# Patient Record
Sex: FEMALE | Employment: UNEMPLOYED | URBAN - METROPOLITAN AREA
[De-identification: names, ages, dates, MRNs, and addresses within clinical notes are randomized per-mention and may not be internally consistent; named-entity substitution may affect disease eponyms.]

---
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
## 2024-03-14 ENCOUNTER — HOSPITAL ENCOUNTER (OUTPATIENT)
Facility: CLINIC | Age: 19
Setting detail: OBSERVATION
Discharge: PSYCHIATRIC OR SUBSTANCE USE FACILITY | End: 2024-03-15
Attending: EMERGENCY MEDICINE | Admitting: EMERGENCY MEDICINE
Payer: COMMERCIAL

## 2024-03-14 DIAGNOSIS — F39 UNSPECIFIED MOOD (AFFECTIVE) DISORDER (H): ICD-10-CM

## 2024-03-14 DIAGNOSIS — F33.1 MODERATE RECURRENT MAJOR DEPRESSION (H): Primary | ICD-10-CM

## 2024-03-14 DIAGNOSIS — F44.9 DISSOCIATION: ICD-10-CM

## 2024-03-14 DIAGNOSIS — R45.851 SUICIDAL IDEATION: ICD-10-CM

## 2024-03-14 PROBLEM — F41.1 GENERALIZED ANXIETY DISORDER: Status: ACTIVE | Noted: 2024-03-14

## 2024-03-14 PROCEDURE — G0378 HOSPITAL OBSERVATION PER HR: HCPCS

## 2024-03-14 PROCEDURE — 99285 EMERGENCY DEPT VISIT HI MDM: CPT

## 2024-03-14 PROCEDURE — 99222 1ST HOSP IP/OBS MODERATE 55: CPT | Performed by: NURSE PRACTITIONER

## 2024-03-14 PROCEDURE — 250N000013 HC RX MED GY IP 250 OP 250 PS 637: Performed by: NURSE PRACTITIONER

## 2024-03-14 RX ORDER — BETAMETHASONE DIPROPIONATE 0.5 MG/G
CREAM TOPICAL 2 TIMES DAILY
Status: DISCONTINUED | OUTPATIENT
Start: 2024-03-14 | End: 2024-03-14

## 2024-03-14 RX ORDER — PRAZOSIN HYDROCHLORIDE 1 MG/1
3 CAPSULE ORAL AT BEDTIME
COMMUNITY
Start: 2024-03-07 | End: 2024-03-15

## 2024-03-14 RX ORDER — VITAMIN B COMPLEX
25 TABLET ORAL DAILY
Status: DISCONTINUED | OUTPATIENT
Start: 2024-03-15 | End: 2024-03-15 | Stop reason: HOSPADM

## 2024-03-14 RX ORDER — PANTOPRAZOLE SODIUM 40 MG/1
40 TABLET, DELAYED RELEASE ORAL DAILY
COMMUNITY
Start: 2024-02-06

## 2024-03-14 RX ORDER — ARIPIPRAZOLE 5 MG/1
5 TABLET ORAL AT BEDTIME
Status: DISCONTINUED | OUTPATIENT
Start: 2024-03-15 | End: 2024-03-15 | Stop reason: HOSPADM

## 2024-03-14 RX ORDER — ONDANSETRON 4 MG/1
4 TABLET, ORALLY DISINTEGRATING ORAL EVERY 6 HOURS PRN
Status: DISCONTINUED | OUTPATIENT
Start: 2024-03-14 | End: 2024-03-15 | Stop reason: HOSPADM

## 2024-03-14 RX ORDER — OXCARBAZEPINE 300 MG/1
300 TABLET, FILM COATED ORAL 2 TIMES DAILY
COMMUNITY

## 2024-03-14 RX ORDER — OXCARBAZEPINE 300 MG/1
300 TABLET, FILM COATED ORAL 2 TIMES DAILY
Status: DISCONTINUED | OUTPATIENT
Start: 2024-03-14 | End: 2024-03-15 | Stop reason: HOSPADM

## 2024-03-14 RX ORDER — LUMATEPERONE 42 MG/1
42 CAPSULE ORAL AT BEDTIME
COMMUNITY
Start: 2024-02-09 | End: 2024-03-15

## 2024-03-14 RX ORDER — NORGESTIMATE AND ETHINYL ESTRADIOL 0.25-0.035
1 KIT ORAL DAILY
COMMUNITY
Start: 2024-02-07

## 2024-03-14 RX ORDER — HYDROXYZINE HYDROCHLORIDE 25 MG/1
25 TABLET, FILM COATED ORAL DAILY PRN
COMMUNITY

## 2024-03-14 RX ORDER — VENLAFAXINE HYDROCHLORIDE 75 MG/1
75 CAPSULE, EXTENDED RELEASE ORAL DAILY
Status: DISCONTINUED | OUTPATIENT
Start: 2024-03-15 | End: 2024-03-15 | Stop reason: HOSPADM

## 2024-03-14 RX ORDER — ARIPIPRAZOLE 5 MG/1
2.5 TABLET ORAL AT BEDTIME
Status: DISCONTINUED | OUTPATIENT
Start: 2024-03-14 | End: 2024-03-14

## 2024-03-14 RX ORDER — HYDROXYZINE HYDROCHLORIDE 25 MG/1
25 TABLET, FILM COATED ORAL 2 TIMES DAILY
COMMUNITY
End: 2024-03-14

## 2024-03-14 RX ORDER — PSYLLIUM HUSK 0.4 G
25 CAPSULE ORAL DAILY
COMMUNITY
Start: 2024-01-22

## 2024-03-14 RX ORDER — CETIRIZINE HYDROCHLORIDE 10 MG/1
10 TABLET ORAL DAILY
COMMUNITY
Start: 2024-01-22

## 2024-03-14 RX ORDER — DOCUSATE SODIUM 100 MG/1
100 CAPSULE, LIQUID FILLED ORAL 2 TIMES DAILY
COMMUNITY

## 2024-03-14 RX ORDER — ACETAMINOPHEN 325 MG/1
650 TABLET ORAL EVERY 4 HOURS PRN
Status: DISCONTINUED | OUTPATIENT
Start: 2024-03-14 | End: 2024-03-15 | Stop reason: HOSPADM

## 2024-03-14 RX ORDER — ARIPIPRAZOLE 5 MG/1
5 TABLET ORAL AT BEDTIME
Status: DISCONTINUED | OUTPATIENT
Start: 2024-03-14 | End: 2024-03-14

## 2024-03-14 RX ORDER — TRAZODONE HYDROCHLORIDE 50 MG/1
50 TABLET, FILM COATED ORAL
Status: DISCONTINUED | OUTPATIENT
Start: 2024-03-14 | End: 2024-03-15 | Stop reason: HOSPADM

## 2024-03-14 RX ORDER — LANOLIN ALCOHOL/MO/W.PET/CERES
3 CREAM (GRAM) TOPICAL
Status: DISCONTINUED | OUTPATIENT
Start: 2024-03-14 | End: 2024-03-15 | Stop reason: HOSPADM

## 2024-03-14 RX ORDER — CETIRIZINE HYDROCHLORIDE 10 MG/1
10 TABLET ORAL DAILY
Status: DISCONTINUED | OUTPATIENT
Start: 2024-03-15 | End: 2024-03-15 | Stop reason: HOSPADM

## 2024-03-14 RX ORDER — NORGESTIMATE AND ETHINYL ESTRADIOL 0.25-0.035
1 KIT ORAL DAILY
Status: DISCONTINUED | OUTPATIENT
Start: 2024-03-15 | End: 2024-03-14

## 2024-03-14 RX ORDER — QUETIAPINE FUMARATE 25 MG/1
25-50 TABLET, FILM COATED ORAL 3 TIMES DAILY PRN
Status: DISCONTINUED | OUTPATIENT
Start: 2024-03-14 | End: 2024-03-15 | Stop reason: HOSPADM

## 2024-03-14 RX ORDER — BETAMETHASONE DIPROPIONATE 0.5 MG/G
OINTMENT, AUGMENTED TOPICAL 2 TIMES DAILY
COMMUNITY
Start: 2023-12-21

## 2024-03-14 RX ORDER — HYDROXYZINE HYDROCHLORIDE 25 MG/1
25 TABLET, FILM COATED ORAL DAILY PRN
Status: DISCONTINUED | OUTPATIENT
Start: 2024-03-14 | End: 2024-03-15 | Stop reason: HOSPADM

## 2024-03-14 RX ORDER — PANTOPRAZOLE SODIUM 40 MG/1
40 TABLET, DELAYED RELEASE ORAL DAILY
Status: DISCONTINUED | OUTPATIENT
Start: 2024-03-15 | End: 2024-03-15 | Stop reason: HOSPADM

## 2024-03-14 RX ORDER — IBUPROFEN 600 MG/1
600 TABLET, FILM COATED ORAL EVERY 6 HOURS PRN
Status: DISCONTINUED | OUTPATIENT
Start: 2024-03-14 | End: 2024-03-15 | Stop reason: HOSPADM

## 2024-03-14 RX ORDER — DOCUSATE SODIUM 100 MG/1
100 CAPSULE, LIQUID FILLED ORAL 2 TIMES DAILY
Status: DISCONTINUED | OUTPATIENT
Start: 2024-03-14 | End: 2024-03-15 | Stop reason: HOSPADM

## 2024-03-14 RX ORDER — CLONAZEPAM 0.5 MG/1
0.5 TABLET ORAL 2 TIMES DAILY PRN
Status: DISCONTINUED | OUTPATIENT
Start: 2024-03-14 | End: 2024-03-15 | Stop reason: HOSPADM

## 2024-03-14 RX ORDER — ARIPIPRAZOLE 5 MG/1
5 TABLET ORAL AT BEDTIME
COMMUNITY
Start: 2024-02-20

## 2024-03-14 RX ORDER — CLONAZEPAM 0.5 MG/1
0.5 TABLET ORAL 2 TIMES DAILY PRN
COMMUNITY

## 2024-03-14 RX ORDER — VENLAFAXINE HYDROCHLORIDE 75 MG/1
75 CAPSULE, EXTENDED RELEASE ORAL DAILY
COMMUNITY
Start: 2024-03-07

## 2024-03-14 RX ADMIN — CLONAZEPAM 0.5 MG: 0.5 TABLET ORAL at 22:33

## 2024-03-14 RX ADMIN — PRAZOSIN HYDROCHLORIDE 3 MG: 2 CAPSULE ORAL at 22:33

## 2024-03-14 RX ADMIN — OXCARBAZEPINE 300 MG: 300 TABLET, FILM COATED ORAL at 22:35

## 2024-03-14 RX ADMIN — ARIPIPRAZOLE 2.5 MG: 5 TABLET ORAL at 22:35

## 2024-03-14 RX ADMIN — HYDROXYZINE HYDROCHLORIDE 25 MG: 25 TABLET, FILM COATED ORAL at 22:33

## 2024-03-14 RX ADMIN — DOCUSATE SODIUM 100 MG: 100 CAPSULE, LIQUID FILLED ORAL at 22:33

## 2024-03-14 RX ADMIN — MELATONIN TAB 3 MG 3 MG: 3 TAB at 22:33

## 2024-03-14 ASSESSMENT — ACTIVITIES OF DAILY LIVING (ADL)
ADLS_ACUITY_SCORE: 35

## 2024-03-14 NOTE — ED TRIAGE NOTES
Feeling increasingly anxious. Has passive suicidal thoughts but no real plan to do anything. Never attempted anything in the past. Arrives thru Triage requesting to go to [facility]

## 2024-03-14 NOTE — ED PROVIDER NOTES
"  History     Chief Complaint:  No chief complaint on file.       HPI   Skye Walker is a 19 year old female lives in a group home presents emergency department with increased anxiety, increased thoughts of self-harm behavior.  Passive thoughts of suicide.  She has not done anything in a self-harm attempt currently.  She has been compliant with her medications.  Denies any self-harm behavior cutting on her skin or intentional medication overdose.  She reports she has had history in the past of striking her head on the wall and cutting her forearms.  However she is not engaged in any of those behaviors.  She is here hoping to be seen in the [facility] unit for her mood.    Independent Historian:   None - Patient Only    Medications:    Albuterol  Amoxicillin  Aripiprazole   Dulcolax   Buspirone   Cetirizine   Vitamin D  Epi    Past Medical History:    Acanthosis nigricans   Anxiety  Eczema   Mood disorder  Cholelithiasis  Depression  Elevated BMI  RAD  Left patella recurrent dislocations  MCL tear   THC use disorder  Vitamin D deficiency  Protonix    Physical Exam   Patient Vitals for the past 24 hrs:   BP Temp Temp src Pulse Resp SpO2 Height Weight   03/14/24 1739 (!) 140/74 98.8  F (37.1  C) Oral 97 16 100 % 1.6 m (5' 3\") 122.5 kg (270 lb)        Physical Exam  Gen: well appearing, in no acute distress  Oral : Mucous membranes moist,   Nose: No rhinorhea  Ears: External near normal, without drainage  Eyes: periorbital tissues and sclera normal   Neck: supple, no abnormal swelling  Lungs: Clear bilaterally, no tachypnea or distress, speaks full sentences  CV: Regular rate, regular rhythm  Skin: warm, dry, well perfused, no rashes/bruising/lesions on exposed skin  Neuro: alert, no gross motor or sensory deficits,   Psych: Mood is depressed, sad weepy at times    Emergency Department Course     Interventions:  Medications - No data to display     Assessments/Consultations/Discussion of Management or Tests:  ED Course " as of 03/14/24 1742   Thu Mar 14, 2024   1742 I evaluated the patient.        Social Determinants of Health affecting care:   Stress/Adjustment Disorders    Disposition:  The patient was discharged to home.     Impression & Plan    Medical Decision Making:  Patient presents with increased anxiety passive suicidal thoughts and some thoughts of some increased self-harm behavior although she is not acted on anything yet.  She is requesting mental health evaluation and is hoping to go to the [facility] unit.  Will work on getting her placed there I feel she is medically clear for focus on psychiatric treatment.    Diagnosis:    ICD-10-CM    1. Emotional crisis  F43.20          Scribe Disclosure:  I, NIKHIL BOOKER, am serving as a scribe at 5:39 PM on 3/14/2024 to document services personally performed by Tarik Naranjo MD based on my observations and the provider's statements to me.      Tarik Naranjo MD  03/14/24 174

## 2024-03-14 NOTE — ED NOTES
[facility]  ED to [facility] Checklist:      Goal for [facility]: Anxiety management and Suicidality    Current Behavior: Anxious    Safety Concerns: None    Legal Hold Status: Voluntary    Medically Cleared by ED provider: Yes    Patient Therapeutically Searched: Not searched - Currently in triage    Belongings: Remain with patient    Independent Ambulation at Baseline: Yes/No: Yes    Participates in Care/Conversation: Yes/No: Yes    Patient Informed about [facility]: Yes/No: Yes    DEC: Ordered and pending    Patient Ready to be Transferred to [facility]? Yes/No: Yes

## 2024-03-15 VITALS
HEIGHT: 63 IN | WEIGHT: 271 LBS | DIASTOLIC BLOOD PRESSURE: 78 MMHG | HEART RATE: 74 BPM | BODY MASS INDEX: 48.02 KG/M2 | RESPIRATION RATE: 18 BRPM | TEMPERATURE: 98.1 F | OXYGEN SATURATION: 100 % | SYSTOLIC BLOOD PRESSURE: 129 MMHG

## 2024-03-15 PROBLEM — F39 MOOD DISORDER (H): Status: ACTIVE | Noted: 2024-03-15

## 2024-03-15 PROCEDURE — G0378 HOSPITAL OBSERVATION PER HR: HCPCS

## 2024-03-15 PROCEDURE — 250N000013 HC RX MED GY IP 250 OP 250 PS 637: Performed by: NURSE PRACTITIONER

## 2024-03-15 PROCEDURE — 99232 SBSQ HOSP IP/OBS MODERATE 35: CPT

## 2024-03-15 RX ORDER — PRAZOSIN HYDROCHLORIDE 2 MG/1
4 CAPSULE ORAL AT BEDTIME
Status: DISCONTINUED | OUTPATIENT
Start: 2024-03-15 | End: 2024-03-15 | Stop reason: HOSPADM

## 2024-03-15 RX ORDER — PRAZOSIN HYDROCHLORIDE 2 MG/1
4 CAPSULE ORAL AT BEDTIME
Qty: 28 CAPSULE | Refills: 0 | Status: SHIPPED | OUTPATIENT
Start: 2024-03-15

## 2024-03-15 RX ADMIN — Medication 25 MCG: at 08:23

## 2024-03-15 RX ADMIN — PANTOPRAZOLE SODIUM 40 MG: 40 TABLET, DELAYED RELEASE ORAL at 08:23

## 2024-03-15 RX ADMIN — OXCARBAZEPINE 300 MG: 300 TABLET, FILM COATED ORAL at 08:24

## 2024-03-15 RX ADMIN — DOCUSATE SODIUM 100 MG: 100 CAPSULE, LIQUID FILLED ORAL at 08:23

## 2024-03-15 RX ADMIN — CETIRIZINE HYDROCHLORIDE 10 MG: 10 TABLET, FILM COATED ORAL at 08:23

## 2024-03-15 RX ADMIN — VENLAFAXINE HYDROCHLORIDE 75 MG: 75 CAPSULE, EXTENDED RELEASE ORAL at 08:23

## 2024-03-15 ASSESSMENT — ACTIVITIES OF DAILY LIVING (ADL)
ADLS_ACUITY_SCORE: 35

## 2024-03-15 NOTE — PROGRESS NOTES
Triage & Transition Services, [facility]     Client Name: Skye Walker    Date: March 14, 2024    Patient was seen    Mode of Assessment:      Service Type: (P) refused to attend group session  Session Start Time:  (P) 1930    Session End Time: (P) 2015  Session Length: (P) 45  Site Location: [facility] EMERGENCY DEPT                             EMP14  Total Number ofAttendees: (P) 3  Topic:   (P) emotions/expression, relaxation techniques   Response:       HAZEL Ma   Licensed Mental Health Professional (LMHP), Extended Care  [phone]
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
## 2024-03-15 NOTE — ED PROVIDER NOTES
[facility]  [facility] Emergency Department  Observation Initiation Date: Mar 14, 2024    Skye Walker MRN: [id number]   Age: 19 year old YOB: 2005         History     Chief Complaint   [facility]     [facility]PI  Skye \"V\" NIKHIL Walker is a 19 year old female with a past history notable for recurrent major depressive disorder and anxiety who presented to the emergency department voluntarily due to increase passive suicide ideation and self-injurious thoughts. She was cleared medically and transferred to [facility] for additional assessment and treatment planning. At the time of the assessment today 3/14/24, she is nearing 4.5 hours in emergency care.    Please see the [facility] assessment & reassessment (if available), ED physician notes and nursing notes for additional information and collateral content if available. All were reviewed prior to meeting with the patient. Pertinent content includes the following: MISBAH lives in an IRTS. Plans are in place for her to leave the IRTS on 3/26/24. There is no current transition plan in place. There was recent psychosocial stressors at the [facility] that heightened symptoms. She recently started taking Caplyta. She is followed by a psychiatric provider through [facility] and is slated to change outpatient providers next month. There is a history of PTSD and childhood trauma.    On approach, MISBAH is meeting me from a conference room. She is engaged throughout the interview and appears to be a partially reliable informant. She was not able to account for which medications she is currently taking and states that staff at the [facility] gives her the medications and that she \"mostly\" takes them. She reports  that her primary concern is how she seems to have dissociated from things recently. She endorses passive SI, and has some unease about psychosocial stressors, but is primarily concerned about the dissociation. She denies " "HI, A/V hallucinations, delusions or paranoia. I attempted to engage her in a shared-decision making process regarding her medications, but she seemed unaware of what she was taking. I asked if she was comfortable with making changes to try to help with the dissociation symptoms which she agreed to.      Past Medical History  No past medical history on file.  No past surgical history on file.  ARIPiprazole (ABILIFY) 5 MG tablet  augmented betamethasone dipropionate (DIPROLENE-AF) 0.05 % external ointment  CAPLYTA 42 MG capsule  cetirizine (ZYRTEC) 10 MG tablet  clonazePAM (KLONOPIN) 0.5 MG tablet  docusate sodium (COLACE) 100 MG capsule  hydrOXYzine HCl (ATARAX) 25 MG tablet  norgestimate-ethinyl estradiol (ORTHO-CYCLEN) 0.25-35 MG-MCG tablet  OXcarbazepine (TRILEPTAL) 300 MG tablet  pantoprazole (PROTONIX) 40 MG EC tablet  prazosin (MINIPRESS) 1 MG capsule  venlafaxine (EFFEXOR XR) 75 MG 24 hr capsule  VITAMIN D-1000 MAX ST 25 MCG (1000 UT) tablet      Allergies   Allergen Reactions    Cats Itching     Runny eyes and nose    Seasonal Allergies Itching     Running nose, and eyes    Shellfish-Derived Products      Family History  No family history on file.  Social History           Review of Systems  A medically appropriate review of systems was performed with pertinent positives and negatives noted in the HPI, and all other systems negative.    Physical Examination   BP: (!) 140/74  Pulse: 97  Temp: 98.8  F (37.1  C)  Resp: 16  Height: 160 cm (5' 3\")  Weight: 122.5 kg (270 lb)  SpO2: 100 %    Physical Exam  General: Appears stated age.   Neuro: Alert and fully oriented. Extremities appear to demonstrate normal strength on visual inspection.   Integumentary/Skin: no rash visualized, normal color    Psychiatric Examination   Appearance: awake, alert  Attitude:  cooperative  Eye Contact:  intense  Mood:   \"dissociative\"  Affect:  intensity is blunted and intensity is flat  Speech:  clear, coherent, decreased prosody, " and paucity of speech  Psychomotor Behavior:  no evidence of tardive dyskinesia, dystonia, or tics  Thought Process:  linear and goal oriented  Associations:  no loose associations  Thought Content:  no evidence of psychotic thought and passive suicidal ideation present  Insight:  fair  Judgement:  intact  Oriented to:  time, person, and place  Attention Span and Concentration:  intact  Recent and Remote Memory:  intact  Language: able to name/identify objects without impairment  Fund of Knowledge: intact with awareness of current and past events    ED Course     ED Course as of 03/14/24 2258   u Mar 14, 2024   7950 I evaluated the patient.        Labs Ordered and Resulted from Time of ED Arrival to Time of ED Departure - No data to display    Assessments & Plan (with Medical Decision Making)   Patient presenting with a history of MDD and PTSD who was recently prescribed Caplyta. Her primary concern is dissociation. There are also current psychosocial stressors related to the mix of people in the milieu of her IRTS and impending discharge from the IRTS. It is unclear why Caplyta was started. She is [facility] by a provider at [facility] and [facility]. She is also on Abilify 5 mg. From my review, there is no evidence of a bipolar depression. The patient is in agreement with medication changes. Nursing notes reviewed noting no acute issues.     I have reviewed the assessment completed by the [facility].     During the observation period, the patient did not require medications for agitation, and did not require restraints/seclusion for patient and/or provider safety.     The patient was found to have a psychiatric condition that would benefit from an observation stay in the emergency department for further psychiatric stabilization and/or coordination of a safe disposition. The observation plan includes serial assessments of psychiatric condition, potential administration of medications if indicated, further disposition  pending the patient's psychiatric course during the monitoring period.     Preliminary diagnosis:    ICD-10-CM    1. Moderate recurrent major depression (H)  F33.1       2. Suicidal ideation  R45.851       3. Dissociation  F44.9            Treatment Plan:  - Admit for observation.  - Discontinue Caplyta as there appears to have been no appreciable benefit and dissociative symptoms increased after starting it.   - Continue prior to presentation medications, with a suggestion to decrease psychopharmacology slowly over time in the outpatient setting. Current medications include:     ARIPiprazole (ABILIFY) 5 MG tablet nightly    clonazePAM (KLONOPIN) 0.5 MG tablet daily as needed    prazosin (MINIPRESS) 300 mg nightly    venlafaxine (EFFEXOR XR) 225 mg daily   - Prior to presentation allopathic medications ordered.  - EmPATH standing order for pain, anxiety, agitation, insomnia and nausea ordered.  -Medication education provided this visit includes, rationale for medication, importance of compliance, medication interactions, and common side effects.   -Supportive psychotherapy provided regarding patient's stressors and past mental health symptoms problem solving ways to improve overall wellness and cope with acute and chronic mental health symptoms.  -Observe overnight and reassess tomorrow.      --  SHELIA Palomino CNP   [facility] EMERGENCY DEPT  EmPATH Unit      Danita Shaver APRN CNP  03/14/24 6819

## 2024-03-15 NOTE — DISCHARGE INSTRUCTIONS
Aftercare Plan    Follow up with established providers and supports as scheduled. Continue taking medications as prescribed. Abstain from drugs and alcohol. Utilize your [facility] mental health crisis team as needed. They are available 24/7. Contact information is listed below.       If I am feeling unsafe or I am in a crisis, I will:   Contact my established care providers   Call the [place] Suicide Prevention Lifeline: [phone]   Go to the nearest emergency room   Call 911     Warning signs that I or other people might notice when a crisis is developing for me: changes to sleep, appetite or mood, increased anger, agitation or irritability, feeling depressed or hopeless, spending more time alone or talking less, increased crying, decreased productivity, seeing or hearing things that aren't there, thoughts of not wanting to live anymore or of actually killing myself, thoughts of hurting others    Things I am able to do on my own to cope or help me feel better: watching a favorite tv show or movie, listening to music I enjoy, going outside and breathing fresh air, going for a walk or exercising, taking a shower or bath, a cold or hot beverage, a healthy snack, drawing/coloring/painting, journaling, singing or dancing, deep breathing     I can try practicing square breathing when I begin to feel anxious - inhale through the nose for the count of 4 and the first line on the square. Exhale through the mouth for the count of 4 for the second line of the square. Repeat to complete the square. Repeat the square as many times as needed.    I can also use my five senses to practice mindfulness and grounding. What are five things I can see, four things I can hear, three things I can feel, two things I can smell, and one thing I can taste.     Things that I am able to do with others to cope or help me feel better: sometimes just talking or spending time with someone else, sharing a meal or having coffee, watching a movie or  "playing a game, going for a walk or exercising    I can also use community resources including mental health hotlines, [facility] crisis teams, or apps.     Things I can use or do for distraction: movies/tv, music, reading, games, drawing/coloring/painting or other art, essential oils, exercise, cleaning/organizing, puzzles, crossword puzzles, word search, Sudoku       I can also download a meditation or relaxation pedro, like Calm, Headspace, or Insight Timer (all three offer a free version)    Changes I can make to support my mental health and wellness: Attend scheduled mental health therapy and psychiatric appointments. Take my medications as prescribed. Maintain a daily schedule/routine. Abstain from all mood altering substances, including drugs, alcohol, or medications not currently prescribed to me. Implement a self-care routine.      People in my life that I can ask for help: friends or family, trusted teachers/staff/colleagues, trusted members of my community or place of [employer], mental health crisis lines, or 911    Your [facility] has a mental health crisis team you can call 24/7: [facility], [phone]    Other things that are important when I m in crisis: to remember that the feelings I am having right now are temporary, and it won't feel like this forever, and that it is okay and important to ask for help    Crisis Lines  Crisis Text Line  Text 962689  You will be connected with a trained live crisis counselor to provide support.    Por espanol, texto  NEMESIO a 111784 o texto a 442-AYUDAME en WhatsApp    [place] Hope Line  1.800.SUICIDE [6804282]      Community Resources  Fast Tracker  Linking people to mental health and substance use disorder resources  fasttrackermn.org     [place] Mental Health Warm Line  Peer to peer support  Monday thru Saturday, 12 pm to 10 pm  [phone] or 6.128.157.4792  Text \"Support\" to 34265    National [place] on Mental Illness (SUNDAY)  661.583.3607 or " 1.888.SUNDAY.HELPS      Mental Health Apps  My3  https://myHarbour Antibodiespp.org/    VirtualHopeBox  https://BlogBus/apps/virtual-hope-box/      Additional Information  Today you were seen by a licensed mental health professional through Triage and Transition services, [facility] (P)  for a crisis assessment in the Emergency Department at [facility].  It is recommended that you follow up with your established providers (psychiatrist, mental health therapist, and/or primary care doctor - as relevant) as soon as possible. Coordinators from [facility] will be calling you in the next 24-48 hours to ensure that you have the resources you need.  You can also contact [facility] coordinators directly at [phone]. You may have been scheduled for or offered an appointment with a mental health provider. [facility] maintains an extensive network of licensed behavioral health providers to connect patients with the services they need.  We do not charge providers a fee to participate in our referral network.  We match patients with providers based on a patient's specific needs, insurance coverage, and location.  Our first effort will be to refer you to a provider within your care system, and will utilize providers outside your care system as needed.

## 2024-03-15 NOTE — ED NOTES
Client has been calm as of this afternoon, resting in sensory, breathing was unlabored, but because in the morning client still endorsed dissociation and A/V hallucination. The conclusion was for her to stay another night in OBS and reassess tomorrow before determining discharge procedures. Client is denying SI/HI.
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
## 2024-03-15 NOTE — PROGRESS NOTES
Discharge instructions reviewed with patient including follow-up care plan. Educated on medication regime and advised not to stop prescribed medication without consulting their physician. Reviewed safety plan and outpatient resources/appointments.Verbalized understanding of discharge instructions. Denies SI. All belongings which where brought into the hospital have been returned to patient. Escorted off the unit at 16:30 by staff.      Discharged to [facility].
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
## 2024-03-15 NOTE — ED NOTES
Pt is A/O x 3, anxious and sad at the same time, feelings of dissociation and endorsing Auditory and visual hallucination. Client reports that she does not have any plans to commit SI nor HI. Pt information or story is not very clear as she says one thing and then kind of place doubt on it. Pt met with the provider and the therapist and came to conclusion that she will benefit from another night on OBS today. Withdrawn to hr chir but ate her break fast and affect is fair when approach.

## 2024-03-15 NOTE — ED NOTES
Pt mostly watched tv and rested. Pt will stay overnight on observation. Pt contracts for safety on the unit.

## 2024-03-15 NOTE — ED PROVIDER NOTES
Patient was assessed earlier today, please refer to progress note. Patient is now requesting to discharge and return to IRTS facility. Patient met with LM and completed safety planning. At time of discharge patient did not meet criteria for involuntary hold. Discharge and return to IRTS facility. Patient to follow-up with established outpatient supports.      Yamileth Christopher, SHELIA CNP  03/15/24 9113

## 2024-03-15 NOTE — PROGRESS NOTES
Triage & Transition Services, Extended Care     Client Name: Skye Walker    Date: March 15, 2024    Patient was seen    Mode of Assessment:      Service Type: (P) refused to attend group session  Session Start Time:  (P) 1200    Session End Time: (P) 1230  Session Length: (P) 30  Site Location: [facility] EMERGENCY DEPT                             [id number]  Total Number ofAttendees: (P) 2  Topic:   (P) coping skills/lifestyle management   Response: (P) other (see comments) (pt declined to participate)     Merry Swift, [facility]   Licensed Mental Health Professional (LMHP), Extended Care  [phone]
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
## 2024-03-15 NOTE — PROGRESS NOTES
Writer completed patient's initial DEC assessment yesterday 3/14/24, and pt had a therapeutic check-in with [facility] this morning. At approximately 1530, patient requested to speak with an LMHP. Pt stated that she feels anxious while remaining at the hospital and is considering discharging tonight. Writer discussed the pros and cons of another night in the ED vs returning to her IRTS. Pt stated that she is anxious about returning to her IRTS tonight due to conflict with a housemate. However, she stated that she is also anxious while in the ED. She stated that she is ruminating on all the things she needs to get done in the next ten days, including packing and finding a group home to transfer to. Pt stated that if she discharges today, she will remain in her room and avoid the housemate. She plans to pack her belongings and spend time with IRTS staff. There are no concerns for safety and her IRTS is agreeable to pt returning.

## 2024-10-17 ENCOUNTER — HOSPITAL ENCOUNTER (OUTPATIENT)
Facility: CLINIC | Age: 19
Setting detail: OBSERVATION
Discharge: HOME | End: 2024-10-18
Attending: EMERGENCY MEDICINE | Admitting: EMERGENCY MEDICINE
Payer: COMMERCIAL

## 2024-10-17 DIAGNOSIS — F41.0 PANIC ATTACK: ICD-10-CM

## 2024-10-17 DIAGNOSIS — R45.851 SUICIDAL THOUGHTS: ICD-10-CM

## 2024-10-17 DIAGNOSIS — F39 UNSPECIFIED MOOD (AFFECTIVE) DISORDER (H): ICD-10-CM

## 2024-10-17 DIAGNOSIS — F43.10 PTSD (POST-TRAUMATIC STRESS DISORDER): ICD-10-CM

## 2024-10-17 PROBLEM — F41.8 DEPRESSION WITH ANXIETY: Status: ACTIVE | Noted: 2024-10-17

## 2024-10-17 PROBLEM — F43.21 ADJUSTMENT DISORDER WITH DEPRESSED MOOD: Status: ACTIVE | Noted: 2024-10-17

## 2024-10-17 PROBLEM — F32.9 MAJOR DEPRESSIVE DISORDER WITH CURRENT ACTIVE EPISODE, UNSPECIFIED DEPRESSION EPISODE SEVERITY, UNSPECIFIED WHETHER RECURRENT: Status: ACTIVE | Noted: 2024-10-17

## 2024-10-17 PROCEDURE — 250N000013 HC RX MED GY IP 250 OP 250 PS 637

## 2024-10-17 PROCEDURE — 250N000013 HC RX MED GY IP 250 OP 250 PS 637: Performed by: NURSE PRACTITIONER

## 2024-10-17 PROCEDURE — 250N000013 HC RX MED GY IP 250 OP 250 PS 637: Performed by: PSYCHIATRY & NEUROLOGY

## 2024-10-17 PROCEDURE — 99285 EMERGENCY DEPT VISIT HI MDM: CPT

## 2024-10-17 PROCEDURE — 99222 1ST HOSP IP/OBS MODERATE 55: CPT

## 2024-10-17 PROCEDURE — G0378 HOSPITAL OBSERVATION PER HR: HCPCS

## 2024-10-17 RX ORDER — HYDROXYZINE HYDROCHLORIDE 50 MG/1
50 TABLET, FILM COATED ORAL EVERY 6 HOURS PRN
Status: DISCONTINUED | OUTPATIENT
Start: 2024-10-17 | End: 2024-10-17

## 2024-10-17 RX ORDER — LURASIDONE HYDROCHLORIDE 20 MG/1
20 TABLET, FILM COATED ORAL
Status: DISCONTINUED | OUTPATIENT
Start: 2024-10-17 | End: 2024-10-18 | Stop reason: HOSPADM

## 2024-10-17 RX ORDER — ALBUTEROL SULFATE 90 UG/1
2 AEROSOL, METERED RESPIRATORY (INHALATION) EVERY 4 HOURS PRN
COMMUNITY
Start: 2023-11-27 | End: 2024-10-18

## 2024-10-17 RX ORDER — HYDROXYZINE HYDROCHLORIDE 25 MG/1
25 TABLET, FILM COATED ORAL EVERY 6 HOURS PRN
Status: DISCONTINUED | OUTPATIENT
Start: 2024-10-17 | End: 2024-10-17

## 2024-10-17 RX ORDER — PRAZOSIN HYDROCHLORIDE 1 MG/1
1 CAPSULE ORAL AT BEDTIME
Status: DISCONTINUED | OUTPATIENT
Start: 2024-10-17 | End: 2024-10-18 | Stop reason: HOSPADM

## 2024-10-17 RX ORDER — TRAZODONE HYDROCHLORIDE 50 MG/1
50 TABLET, FILM COATED ORAL AT BEDTIME
Status: DISCONTINUED | OUTPATIENT
Start: 2024-10-17 | End: 2024-10-17

## 2024-10-17 RX ORDER — TRAZODONE HYDROCHLORIDE 50 MG/1
50 TABLET, FILM COATED ORAL
Status: DISCONTINUED | OUTPATIENT
Start: 2024-10-17 | End: 2024-10-18 | Stop reason: HOSPADM

## 2024-10-17 RX ORDER — HYDROXYZINE HYDROCHLORIDE 25 MG/1
25 TABLET, FILM COATED ORAL EVERY 6 HOURS PRN
Status: DISCONTINUED | OUTPATIENT
Start: 2024-10-17 | End: 2024-10-18 | Stop reason: HOSPADM

## 2024-10-17 RX ORDER — HYDROXYZINE HYDROCHLORIDE 50 MG/1
50 TABLET, FILM COATED ORAL EVERY 6 HOURS PRN
Status: DISCONTINUED | OUTPATIENT
Start: 2024-10-17 | End: 2024-10-18 | Stop reason: HOSPADM

## 2024-10-17 RX ORDER — ACETAMINOPHEN 325 MG/1
650 TABLET ORAL EVERY 4 HOURS PRN
Status: DISCONTINUED | OUTPATIENT
Start: 2024-10-17 | End: 2024-10-18 | Stop reason: HOSPADM

## 2024-10-17 RX ADMIN — TRAZODONE HYDROCHLORIDE 25 MG: 50 TABLET ORAL at 04:26

## 2024-10-17 RX ADMIN — PRAZOSIN HYDROCHLORIDE 1 MG: 1 CAPSULE ORAL at 22:47

## 2024-10-17 RX ADMIN — ACETAMINOPHEN 325MG 650 MG: 325 TABLET ORAL at 04:26

## 2024-10-17 RX ADMIN — HYDROXYZINE HYDROCHLORIDE 25 MG: 25 TABLET ORAL at 15:36

## 2024-10-17 RX ADMIN — LURASIDONE HYDROCHLORIDE 20 MG: 20 TABLET, COATED ORAL at 17:43

## 2024-10-17 RX ADMIN — TRAZODONE HYDROCHLORIDE 25 MG: 50 TABLET ORAL at 22:47

## 2024-10-17 RX ADMIN — ACETAMINOPHEN 325MG 650 MG: 325 TABLET ORAL at 15:34

## 2024-10-17 RX ADMIN — HYDROXYZINE HYDROCHLORIDE 50 MG: 50 TABLET, FILM COATED ORAL at 23:28

## 2024-10-17 ASSESSMENT — ACTIVITIES OF DAILY LIVING (ADL)
ADLS_ACUITY_SCORE: 35

## 2024-10-17 ASSESSMENT — COLUMBIA-SUICIDE SEVERITY RATING SCALE - C-SSRS
3. HAVE YOU BEEN THINKING ABOUT HOW YOU MIGHT KILL YOURSELF?: NO
2. HAVE YOU ACTUALLY HAD ANY THOUGHTS OF KILLING YOURSELF IN THE PAST MONTH?: YES
6. HAVE YOU EVER DONE ANYTHING, STARTED TO DO ANYTHING, OR PREPARED TO DO ANYTHING TO END YOUR LIFE?: NO
4. HAVE YOU HAD THESE THOUGHTS AND HAD SOME INTENTION OF ACTING ON THEM?: NO
5. HAVE YOU STARTED TO WORK OUT OR WORKED OUT THE DETAILS OF HOW TO KILL YOURSELF? DO YOU INTEND TO CARRY OUT THIS PLAN?: NO
1. IN THE PAST MONTH, HAVE YOU WISHED YOU WERE DEAD OR WISHED YOU COULD GO TO SLEEP AND NOT WAKE UP?: YES

## 2024-10-17 NOTE — PROGRESS NOTES
Patient approached nurses station requesting tylenol for a headache. Rated 10/10 on pain scale. Given PRN tylenol. Also, given hydroxyzine 25 mg for anxiety.

## 2024-10-17 NOTE — ED PROVIDER NOTES
"[facility] Unit - Initial Psychiatric Observation Note  [facility] Emergency Department  Observation Initiation Date: Oct 17, 2024    Skye Walker MRN: [id number]   Age: 19 year old YOB: 2005     History     Chief Complaint   Patient presents with    Mental Health Problem     HPI  Skye Walker is a 19 year old female with a past history notable for anxiety, depression, bipolar disorder, PTSD, ASD, and borderline personality disorder who presented to the ED with increasing episodes of dissociation, urges to self-harm and passive suicidal ideation in the context of various psychosocial stressors. Patient reports a recent break-up with her boyfriend and stopping all medications approximately one month ago to be contributing to current presentation. In the emergency department, this patient was determined to be medically stable and transferred to the [facility] unit for psychiatric assessment. They are nearing 14 hours in the emergency department. Skye was agreeable to meet with me, they go by \"V.\" V explains that she felt increasingly anxious and upset following a break-up with her boyfriend yesterday. She was having intense urges to self-harm and decided to Uber to [facility] for help. She recalls that since discharging from inpatient last month she stopped taking medications. She has had increasing anxiety leading to periods of \"dissociation.\" She has had difficulty sleeping and reports ongoing nightmares. Her mood has federica labile. She will \"lose it\" at times when anxious. When not anxious her low mood is prominent often leading to fatigue and poor concentration. She tells me she has been diagnosed recently as bipolar however she disagrees with this and says that she completed an assessment at [facility] and was told that she has depression, ASD, anxiety, borderline personality disorder and trauma. When asked about noemi she says that when she was younger she would have several days at at time that she would " "not sleep,  engage in risky sexual behavior, and experienced intense mood fluctuations. In regards to medications, she recalls numerous previous medication trials including a variety of SSRIs, SNRIs, and mood stabilizers. She has difficulty with medication compliance and would prefer a simplified medication regimen targeting depression and mood stabilization. She expresses concerns with weight gain while taking Abilify and Seroquel in the past and does not wish to resume these. She has found prazosin helpful for nightmares and would like to restart this. She would like to remain at [facility] under observation while restarting medications.     Past Medical History  No past medical history on file.  No past surgical history on file.  ARIPiprazole (ABILIFY) 5 MG tablet  augmented betamethasone dipropionate (DIPROLENE-AF) 0.05 % external ointment  cetirizine (ZYRTEC) 10 MG tablet  clonazePAM (KLONOPIN) 0.5 MG tablet  docusate sodium (COLACE) 100 MG capsule  hydrOXYzine HCl (ATARAX) 25 MG tablet  norgestimate-ethinyl estradiol (ORTHO-CYCLEN) 0.25-35 MG-MCG tablet  OXcarbazepine (TRILEPTAL) 300 MG tablet  pantoprazole (PROTONIX) 40 MG EC tablet  prazosin (MINIPRESS) 2 MG capsule  venlafaxine (EFFEXOR XR) 75 MG 24 hr capsule  VENTOLIN  (90 Base) MCG/ACT inhaler  VITAMIN D-1000 MAX ST 25 MCG (1000 UT) tablet      Allergies   Allergen Reactions    Cats Itching     Runny eyes and nose    Seasonal Allergies Itching     Running nose, and eyes    Shellfish-Derived Products      Family History  No family history on file.  Social History           Review of Systems  A medically appropriate review of systems was performed with pertinent positives and negatives noted in the HPI, and all other systems negative.    Physical Examination   BP: (!) 145/73  Pulse: 103  Temp: 98.8  F (37.1  C)  Resp: 20  Height: 160 cm (5' 3\")  Weight: 127 kg (280 lb)  SpO2: 96 %    Physical Exam  General: Appears stated age.   Neuro: Alert and fully " oriented. Extremities appear to demonstrate normal strength on visual inspection.   Integumentary/Skin: no rash visualized, normal color    Psychiatric Examination   Appearance: awake, alert, adequately groomed, appeared as age stated, and casually dressed  Attitude:  cooperative  Eye Contact:  good  Mood:  anxious and depressed  Affect:  mood congruent and intensity is blunted  Speech:  clear, coherent and normal prosody  Psychomotor Behavior:  no evidence of tardive dyskinesia, dystonia, or tics and intact station, gait and muscle tone  Thought Process:  logical and linear  Associations:  no loose associations  Thought Content:  no evidence of suicidal ideation or homicidal ideation and no evidence of psychotic thought  Insight:  fair  Judgement:  fair  Oriented to:  time, person, and place  Attention Span and Concentration:  fair  Recent and Remote Memory:  fair  Language: able to name/identify objects without impairment  Fund of Knowledge: intact with awareness of current and past events    ED Course     ED Course as of 10/17/24 1655   Thu Oct 17, 2024   0256 I obtained history and examined the patient as noted above.        Labs Ordered and Resulted from Time of ED Arrival to Time of ED Departure - No data to display    Assessments & Plan (with Medical Decision Making)   Patient presenting with increasing affect instability, depression, and dissociative episodes in the context of psychosocial stressors and medication non compliance. Patient reports hx dx of anxiety, depression, bipolar disorder, PTSD, ASD, and borderline personality disorder with difficulties maintaining medication adherence. Treatment plan focused on mediations further targeting depression, affect stabilization, and nightmares while spending time in the therapeutic milieu allowing for a decrease in intensity of symptoms. Nursing notes reviewed noting no acute issues.     I have reviewed the assessment completed by the [facility].     During the  observation period, the patient did not require medications for agitation, and did not require restraints/seclusion for patient and/or provider safety.     The patient was found to have a psychiatric condition that would benefit from an observation stay in the emergency department for further psychiatric stabilization and/or coordination of a safe disposition. The observation plan includes serial assessments of psychiatric condition, potential administration of medications if indicated, further disposition pending the patient's psychiatric course during the monitoring period.     Preliminary diagnosis:    ICD-10-CM    1. Suicidal thoughts  R45.851       2. Panic attack  F41.0       3. Unspecified mood (affective) disorder (H)  F39       4. PTSD (post-traumatic stress disorder)  F43.10            Treatment Plan:  -Start Latuda 20mg daily further targeting depression and affect stability  -Start prazosin 1mg at bedtime targeting nightmares, titrate as tolerable   -Medication education provided this visit including but not limited to: Rationale for medication, importance of medication adherence, medication interactions, common medication side effects, benefits of medications.  -Individual psychotherapy and outpatient psychiatric care recommended for additional support and ongoing development of nonpharmacologic coping skills and strategies.    -Anticipate resuming outpatient medication management appointments and psychotherapy once stable   -Problem focused supportive therapy and education provided today related to patient's current and acute stressors, symptoms, and diagnoses.  -Remain at [facility] under observation with reassessment tomorrow     --  SHELIA Johnson CNP   [facility] EMERGENCY DEPT  [facility] Unit       Yamileth Christopher APRN CNP  10/17/24 [id number]

## 2024-10-17 NOTE — PLAN OF CARE
Skye Walker  October 17, 2024  Plan of Care Hand-off Note     Patient Care Path: observation    Plan for Care:   A lower level of care has been unsuccessful in treating and stabilizing patient s mental health symptoms. Patient will remain on [facility] unit under observation for continued monitoring, treatment and therapeutic intervention of mental health symptoms. Observation at [facility] could help mitigate the need for a more restrictive level of care in an inpatient setting.    Identified Goals and Safety Issues: decrease symptoms, engage in safety planning    Overview:  Elizabeth Walker, mother, [phone]         Legal Status: Legal Status at Admission: Voluntary/Patient has signed consent for treatment    Psychiatry Consult: pt will meet with psychiatry provider while on [facility] unit       Updated RN regarding plan of care.           Faith Mathur, LPCC, LADC

## 2024-10-17 NOTE — ED PROVIDER NOTES
"  Emergency Department Note      History of Present Illness     Chief Complaint   Mental Health Problem      HPI   Skye Walker is a 19 year old female with history of MDD, WILMAR, and panic disorder who presents to the ED for evaluation of a mental health problem. The patient reports she has been having a \"hard time\" for the past 8 months. This morning, she broke up with her boyfriend which was unexpected. She has since been experiencing increased anxiety and depression. Skye states she has been having panic attacks \"all morning,\" however, this is not new and she has a panic attack once a day. This typically presents as chest tightness radiating down the upper extremities, shortness of breath, hyperventilating, and generalized tingling and cramping. She endorses suicidal ideation this morning with plan to overdose. She did not act on these thoughts. Skye is not currently on medications. She has been to [facility] previously. Patient lives with her parents and 7 siblings.     Independent Historian   None    Review of External Notes   None    Past Medical History     Medical History and Problem List   MDD  WILMAR  Mood disorder   Acanthosis nigricans  NSAID allergy  Eczema  Cholelithiasis  Asthma  Reactive airway disease  Restless sleeper  Seasonal allergies   THC use disorder  Thrombocytosis  Vitamin D deficiency  Pneumonia  Panic disorder  Tinnitus     Medications   Hydroxyzine     Physical Exam     Patient Vitals for the past 24 hrs:   BP Temp Temp src Pulse Resp SpO2 Height Weight   10/17/24 0243 (!) 145/73 -- -- -- -- -- -- --   10/17/24 0242 -- 98.8  F (37.1  C) Oral 103 20 96 % 1.6 m (5' 3\") 127 kg (280 lb)     Physical Exam  General: Anxiety and depressed mood.  Head:  The scalp, face, and head appear normal  Eyes:  The pupils are equal, round, and reactive to light    There is no nystagmus    Extraocular muscles are intact    Conjunctivae and sclerae are normal  ENT:    The nose is normal    Pinnae are " normal    The oropharynx is normal  Neck:  Normal range of motion    There is no rigidity noted  CV:  Regular rate  Normal underlying rhythm     Normal S1/S2    No pathological murmur detected  Resp:  Lungs are clear    There is no tachypnea    Non-labored    No rales    No wheezing   GI:  Abdomen is soft, there is no rigidity    No distension/tympani    No rebound tenderness     Non-surgical without peritoneal features at this time  MS:  Normal muscular tone    Symmetric motor strength    No major joint effusions    No asymmetric leg swelling    No calf tenderness  Skin:  No rash or acute skin lesions noted  Neuro:  Speech is normal and fluent, there is no aphasia    No motor deficits    Cranial nerves are intact  Psych: Awake. Alert.     Flat affect    Has suicidal ideation with tentative plan of pill overdose, did not execute on that.    No homicidal ideation    No visual hallucinations    No auditory hallucinations    No delusions    No manic features    Diagnostics     Lab Results   Labs Ordered and Resulted from Time of ED Arrival to Time of ED Departure - No data to display    Imaging   No orders to display       Independent Interpretation   None    ED Course      Medications Administered   Medications - No data to display    Procedures   Procedures     Discussion of Management   None    ED Course   ED Course as of 10/17/24 0307   Thu Oct 17, 2024   0256 I obtained history and examined the patient as noted above.        Additional Documentation      Medical Decision Making / Diagnosis     CMS Diagnoses: None    MIPS       None    MDM   Skye Walker is a 19 year old female with a history of major depression, generalized anxiety disorder, frequent panic attacks, had a unexpected break-up with her boyfriend this morning.  She notes that she has not felt entirely well for about 8 months.  The patient had transient suicidal thoughts enter her mind this morning and she thought about the possibility of taking  some pills and she has many around, but she decided not to.  She opted to come to the emergency department to be seen at the [facility] area.  The patient's not interested in any acute medications for anxiety or panic at this time, as she has done a pretty nice job stabilizing her panicky symptoms from earlier today.  She was offered some low-dose of lorazepam but she prefers not to have that because she knows it could make her tired she wants to be able to participate in therapy at this morning.  The patient is not taking the long list of medications that she used to be on, which is somewhat concerning.  The only thing that she is taking at this time is some as needed hydroxyzine.  Hopefully there can be further discussions of medication options for her in the [facility] area.  She is medically clear at this time.    Disposition   The patient was transferred to [facility].     Diagnosis     ICD-10-CM    1. Depression with anxiety  F41.8       2. Suicidal thoughts  R45.851       3. Panic attack  F41.0       4. Adjustment disorder with depressed mood  F43.21            Discharge Medications   New Prescriptions    No medications on file         Scribe Disclosure:  I, Yajaira Christianson, am serving as a scribe at 3:05 AM on 10/17/2024 to document services personally performed by Brayan Spencer MD based on my observations and the provider's statements to me.        Brayan Spencer MD  10/17/24 [id number]

## 2024-10-17 NOTE — PROGRESS NOTES
RN checked in with patient. Patient states she continues to have a headache after taking tylenol. States she cannot take any other medication for her headache other than tylenol. Reports she continues to feel anxious after receiving hydroxyzine. Vital signs assessed, WNL. Ate dinner. Given scheduled latuda. Denies need for anything at this time.

## 2024-10-17 NOTE — PROGRESS NOTES
19 year old female with history of MDD, WILMAR and mood disorder received from ED due to suicide ideation. Reports breaking up with boyfriend this morning which triggered her symptoms. Endorses SI with plans to take all pills at once but never acted upon, SIB by self cutting (thigh). Patient also endorses auditory/visual hallucinations but cannot specify. Patient contracts to safety while on the unit. Nursing and risk assessments completed. Assessments reviewed with LMHP. Admission information reviewed with patient. Patient given a tour of [facility] and instructions on using the facility. Questions regarding [facility] addressed. Pt safety search completed.

## 2024-10-17 NOTE — PROGRESS NOTES
Patient is in the recliner resting. Received Tylenol for headache and Trazodone 25 mg to help with sleep.

## 2024-10-17 NOTE — CONSULTS
Diagnostic Evaluation Consultation  Crisis Assessment    Patient Name: Skye Walker  Age:  19 year old  Legal Sex: female  Gender Identity: female  Pronouns: she/her  Race: Choose not to Answer  Ethnicity: Choose not to answer  Language: English      Patient was assessed: Virtual: iPad   Crisis Assessment Start Date: 10/17/24  Crisis Assessment Start Time: 0357  Crisis Assessment Stop Time: 0415  Patient location: [facility] EMERGENCY DEPT                             [place] [address]    Referral Data and Chief Complaint  Skye Walker presents to the ED by  self. Patient is presenting to the ED for the following concerns: Anxiety, Depression.   Factors that make the mental health crisis life threatening or complex are:  Pt presents to the ED for mental health evaluation due to suicidal ideation. Pt states that for the past few months she has been experiencing 'derealization and depersonalization and dissociation. It's been bad anxiety.' Pt reports that earlier yesterday morning she and her boyfriend broke up and she 'lost it.' She states she had a few panic attacks and she hasn't been able to calm down all day. Right before she decided to come in to the ED tonight she had thoughts of wanting to harm herself. She started to act on it via cutting and then stopped. Pt states that she has not engaged in SIB recently until today. She reports that she had SI this morning. She states that SI thoughts tend to occur when she feels upset, SI thought is the first thought when something goes bad. Pt reports low motivation, feeling down, increased anxiety symptoms.      Informed Consent and Assessment Methods  Explained the crisis assessment process, including applicable information disclosures and limits to confidentiality, assessed understanding of the process, and obtained consent to proceed with the assessment.  Assessment methods included conducting a formal interview with patient, review of medical records,  collaboration with medical staff, and obtaining relevant collateral information from family and community providers when available: done     Patient response to interventions: verbalizes understanding  Coping skills were attempted to reduce the crisis:  coming to the ED     History of the Crisis   Pt was previously on [facility] 7 months ago for similar presentation. Since discharge, pt has presented to [facility] ED and spent a week inpatient at [facility]. Pt states that when she was discharged she stopped taking her medication and did not follow up with resources. Pt states that she does not currently have a therapist or psychiatrist. She states that she takes hydroxyzine for anxiety symptoms. Patient identified previous mental health diagnoses of MDD, WILMAR, borderline personality disorder, bipolar disorder, ASD, and PTSD. She reported hearing voices and feeling anxious at baseline. Pt has a history of cutting and head banging. She last engaged in self-harm by cutting in January 2024. Pt stated that dissociation symptoms emerged in September 2023.    Brief Psychosocial History  Family:   , Children no  Support System:     Employment Status:  unemployed  Source of Income:  none  Financial Environmental Concerns:  unemployed  Current Hobbies:     Barriers in Personal Life:  lack of motivation, mental health concerns    Significant Clinical History  Current Anxiety Symptoms:  racing thoughts, excessive worry, anxious  Current Depression/Trauma:  avoidance, withdrawl/isolation, difficulty concentrating, negativistic, low self esteem, thoughts of death/suicide, helplessness, hopelessness  Current Somatic Symptoms:  excessive worry, anxious, racing thoughts  Current Psychosis/Thought Disturbance:     Current Eating Symptoms:  loss of appetite  Chemical Use History:  Alcohol: None  Benzodiazepines: None  Opiates: None  Cocaine: None  Marijuana: None  Other Use: None  Addictions: Sexual, Gambling, Shopping   Past diagnosis:  Bipolar  Disorder, Anxiety Disorder, Depression, Personality Disorder, Suicide attempt(s), PTSD, Autism  Family history:  No known history of mental health or chemical health concerns  Past treatment:  Individual therapy, Psychiatric Medication Management, Supportive Living Environment (group home, senior care house, etc), Inpatient Hospitalization  Details of most recent treatment:  pt does not report current providers  Other relevant history:          Collateral Information  Is there collateral information: No (attempted to contact pts mother, Elizabeth Walker ([phone]), to obtain collateral information. no answer at this time)        Risk Assessment  [place] Suicide Severity Rating Scale Full Clinical Version:  Suicidal Ideation  Q1 Wish to be Dead (Lifetime): Yes  Q2 Non-Specific Active Suicidal Thoughts (Lifetime): Yes  3. Active Suicidal Ideation with any Methods (Not Plan) Without Intent to Act (Lifetime): Yes  Q4 Active Suicidal Ideation with Some Intent to Act, Without Specific Plan (Lifetime): Yes  Q5 Active Suicidal Ideation with Specific Plan and Intent (Lifetime): No  Q6 Suicide Behavior (Lifetime): no     Suicidal Behavior (Lifetime)  Actual Attempt (Lifetime): Yes  Total Number of Actual Attempts (Lifetime): 1  Has subject engaged in non-suicidal self-injurious behavior? (Lifetime): Yes  Interrupted Attempts (Lifetime): No  Aborted or Self-Interrupted Attempt (Lifetime): No  Preparatory Acts or Behavior (Lifetime): No    [place] Suicide Severity Rating Scale Recent:   Suicidal Ideation (Recent)  Q1 Wished to be Dead (Past Month): yes  Q2 Suicidal Thoughts (Past Month): yes  Q3 Suicidal Thought Method: yes  Q4 Suicidal Intent without Specific Plan: no  Q5 Suicide Intent with Specific Plan: no  Level of Risk per Screen: moderate risk  Intensity of Ideation (Recent)  Frequency (Past 1 Month): 2-5 times in week  Duration (Past 1 Month): Less than 1 hour/some of the time  Controllability (Past 1 Month): Can  "control thoughts with little difficulty  Deterrents (Past 1 Month): Deterrents probably stopped you  Reasons for Ideation (Past 1 Month): Equally to get attention, revenge, or a reaction from others and to end/stop the pain  Suicidal Behavior (Recent)  Actual Attempt (Past 3 Months): No  Has subject engaged in non-suicidal self-injurious behavior? (Past 3 Months): Yes  Interrupted Attempts (Past 3 Months): No  Aborted or Self-Interrupted Attempt (Past 3 Months): No  Preparatory Acts or Behavior (Past 3 Months): No    Environmental or Psychosocial Events: challenging interpersonal relationships, helplessness/hopelessness, unemployment/underemployment, other life stressors  Protective Factors: Protective Factors: lives in a responsibly safe and stable environment, able to access care without barriers    Does the patient have thoughts of harming others? Feels Like Hurting Others: no  Previous Attempt to Hurt Others: no  Is the patient engaging in sexually inappropriate behavior?: no    Is the patient engaging in sexually inappropriate behavior?  no        Mental Status Exam   Affect: Appropriate  Appearance: Appropriate  Attention Span/Concentration: Attentive  Eye Contact: Engaged    Fund of Knowledge: Appropriate   Language /Speech Content: Fluent  Language /Speech Volume: Normal  Language /Speech Rate/Productions: Normal  Recent Memory: Intact  Remote Memory: Intact  Mood: Sad  Orientation to Person: Yes   Orientation to Place: Yes  Orientation to Time of Day: Yes  Orientation to Date: Yes     Situation (Do they understand why they are here?): Yes  Psychomotor Behavior: Normal  Thought Content: Clear  Thought Form: Intact        Medication  Psychotropic medications:   Medication Orders - Psychiatric (From admission, onward)      Start     Dose/Rate Route Frequency Ordered Stop    10/17/24 0414  traZODone (DESYREL) half-tab 25 mg        Placed in \"Or\" Linked Group    25 mg Oral AT BEDTIME PRN 10/17/24 0414      " "10/17/24 0414  traZODone (DESYREL) tablet 50 mg        Placed in \"Or\" Linked Group    50 mg Oral AT BEDTIME PRN 10/17/24 0414      10/17/24 0413  hydrOXYzine HCl (ATARAX) tablet 25 mg        Placed in \"Or\" Linked Group    25 mg Oral EVERY 6 HOURS PRN 10/17/24 0414      10/17/24 0413  hydrOXYzine HCl (ATARAX) tablet 50 mg        Placed in \"Or\" Linked Group    50 mg Oral EVERY 6 HOURS PRN 10/17/24 0414               Current Care Team  Patient Care Team:  No Ref-Primary, Physician as PCP - General  Marely Bull as Therapist  Geo Mathias as Psychiatrist    Diagnosis  Patient Active Problem List   Diagnosis Code    Major depressive disorder, recurrent, moderate (H) F33.1    Generalized anxiety disorder F41.1    Mood disorder (H) F39    Adjustment disorder with depressed mood F43.21    Panic attack F41.0    Depression with anxiety F41.8    Suicidal thoughts R45.851    Major depressive disorder with current active episode, unspecified depression episode severity, unspecified whether recurrent F32.9       Primary Problem This Admission  Active Hospital Problems    Adjustment disorder with depressed mood      Panic attack      Depression with anxiety      Suicidal thoughts      *Major depressive disorder with current active episode, unspecified depression episode severity, unspecified whether recurrent        Clinical Summary and Substantiation of Recommendations   A lower level of care has been unsuccessful in treating and stabilizing patient s mental health symptoms. Patient will remain on [facility] unit under observation for continued monitoring, treatment and therapeutic intervention of mental health symptoms. Observation at [facility] could help mitigate the need for a more restrictive level of care in an inpatient setting.       Patient coping skills attempted to reduce the crisis:  coming to the ED    Disposition  Recommended disposition: Observation        Reviewed case and recommendations with attending provider. " Attending Name: provider not available at time of assessment       Attending concurs with disposition:  (provider not available at time of assessment)       Patient and/or validated legal guardian concurs with disposition:   yes       Final disposition:  observation    Legal status on admission: Voluntary/Patient has signed consent for treatment    Assessment Details   Total duration spent with the patient: 18 min     CPT code(s) utilized: Non-Billable    REAGAN Alexander, LADC, Psychotherapist  [facility] - Triage & Transition Services  Callback: [phone]

## 2024-10-17 NOTE — ED NOTES
[facility]  ED to [facility] Checklist:      Goal for [facility]: Suicidality    Current Behavior: Anxious    Safety Concerns: Suicidal, no plan    Legal Hold Status: Voluntary    Medically Cleared by ED provider: Yes    Patient Therapeutically Searched: N/A    Belongings: Remain with patient    Independent Ambulation at Baseline: Yes/No: Yes    Participates in Care/Conversation: Yes/No: Yes    Patient Informed about [facility]: Yes/No: Yes    DEC: Ordered and pending    Patient Ready to be Transferred to [facility]? Yes/No: Yes

## 2024-10-17 NOTE — PROGRESS NOTES
"Triage & Transition Services, Extended Care     Therapy Progress Note    Patient: Skye goes by \"V\" uses she/her pronouns  Date of Service: October 17, 2024  Site of Service: [facility] EMERGENCY DEPT                             EMP12  Patient was seen yes  Mode of Assessment: In person    Presentation Summary: Pt observed to be sitting upright in a chair on milieu, per nursing staff, she was provided a PRN around 6am and has been sleeping most of the day. Pt agreeable to meeting w/ LMHP in consult room. Pt slightly drowsy, she says this is first time she has been able to sleep in several days. Pt is interested in restarting her mental health medications, she stopped taking them after she was discharged from  in August 2024. Pt has been struggling with low energy, no motivation, disturbed sleep w/ nightmares. Pt says she has struggled w/ insomnia for most of her life, she has a fear of dying in her sleep. Pt experiences heart racing, tightness in her chest, and a sense of doom at night. Pt endorses visual hallucinations, she says she was seeing shadows and bugs yesterday. Pt has previously experienced hallucinations w/ worsening stress. Pt tells LMHP she broke up with her boyfriend yesterday, she says they argued for hours. Pt continues to endorse SI and the urge to harm herself. Pt tells LMHP she is unable to safety plan and return home this evening. Pt is interested in restarting her medication and staying another night at [facility].    Therapeutic Intervention(s) Provided: Engaged in safety planning, Reviewed healthy living that supports positive mental health, including looking at sleep hygiene, regular movement, nutrition, and regular socialization.    Current Symptoms: excessive worry, shortness of breath or racing heart, anxious sense of doom, negativistic, irritable, helplessness, hopelessness, sadness, thoughts of death/suicide excessive worry, shortness of breath or racing heart, anxious " visual hallucinations  (Pt does not endorse eating or appetite concerns at this time.)    Mental Status Exam   Affect: Flat  Appearance: Appropriate  Attention Span/Concentration: Attentive  Eye Contact: Variable    Fund of Knowledge: Appropriate   Language /Speech Content: Fluent  Language /Speech Volume: Normal  Language /Speech Rate/Productions: Normal  Recent Memory: Intact  Remote Memory: Intact  Mood: Depressed, Irritable  Orientation to Person: Yes   Orientation to Place: Yes  Orientation to Time of Day: Yes  Orientation to Date: Yes     Situation (Do they understand why they are here?): Yes  Psychomotor Behavior: Normal  Thought Content: Suicidal  Thought Form: Intact    Treatment Objective(s) Addressed: rapport building, safety planning, assessing safety, identifying treatment goals    Patient Response to Interventions: acceptance expressed    Progress Towards Goals: Patient Reports Symptoms Are: ongoing  Patient Progress Toward Goals: is making progress (Pt has been able to sleep.)  Next Step to Work Toward Discharge: symptom stabilization, patient ability to engage in safety planning  Symptom Stabilization Comment: Pt continues to endorse urges for self harm.  Ability to Engage Comment: Pt tells [facility] she is unable to safety plan to return to the community.      Plan: observation  yes provider, RN provider not available at time of discharge  yes    Clinical Substantiation: Pt is a 19 year old female with a history of MDD, WILMAR, PTSD. Pt also tells [facility] she was recently diagnosed w/ ASD and BPD.  Pt was last hospitalized for her mental health in August 2024, pt states she stopped taking her medication a few weeks after discharge. Prior to that, she was seen at [facility] in March 2024. Pt is working with a psychiatrist but hasn't had an appointment since August and interested in a referral for someone new as she doesn't agree with a bipolar diagnosis the provider gave. Pt says she taes hydroxyzine for anxiety  symptoms, in the past she has also taken Abilify, Zyprexa, and Klonopin.  Pt reports hearing voices and feeling anxious at baseline, she reports seeing bugs and shadows yesterday. Pt has a history of cutting and head banging, she last engaged in self-harm by cutting in January 2024. Pt seems to be most concerned about disturbed sleep at this time.    Legal Status: Legal Status at Admission: Voluntary/Patient has signed consent for treatment    Session Status: Time session started: 1430  Time session ended: 1450  Session Duration (minutes): 20 minutes  Session Number: 1  Anticipated number of sessions or this episode of care: 2    Time Spent: 20 minutes    CPT Code: CPT Codes: 94578 - Psychotherapy (with patient) - 30 (16-37*) min    Diagnosis:   Patient Active Problem List   Diagnosis Code    Major depressive disorder, recurrent, moderate (H) F33.1    Generalized anxiety disorder F41.1    Mood disorder (H) F39    Adjustment disorder with depressed mood F43.21    Panic attack F41.0    Depression with anxiety F41.8    Suicidal thoughts R45.851    Major depressive disorder with current active episode, unspecified depression episode severity, unspecified whether recurrent F32.9       Primary Problem This Admission:     Major depressive disorder, recurrent, moderate (H) F33.1   Generalized anxiety disorder F41.1   Mood disorder (H) F39         HAZEL Schumacher   Licensed Mental Health Professional (LMHP), [facility]  [phone]

## 2024-10-17 NOTE — ED TRIAGE NOTES
passive thoughts of hurting herself.  No plan, method or history of attempts, significant MH history she is currently suppose to take medications but she is not currently taking her meds.  She would like to go to [facility]

## 2024-10-17 NOTE — ED NOTES
Pt woke up for vital signs. States she has not slept in 1 week and has not taken her regular medications for over 2 months. She would like to restart her medications. Denies SI currently. Pt currently sleeping

## 2024-10-18 VITALS
OXYGEN SATURATION: 97 % | TEMPERATURE: 97.5 F | BODY MASS INDEX: 50.23 KG/M2 | HEIGHT: 63 IN | SYSTOLIC BLOOD PRESSURE: 128 MMHG | RESPIRATION RATE: 18 BRPM | HEART RATE: 75 BPM | DIASTOLIC BLOOD PRESSURE: 84 MMHG | WEIGHT: 283.5 LBS

## 2024-10-18 PROCEDURE — G0378 HOSPITAL OBSERVATION PER HR: HCPCS

## 2024-10-18 PROCEDURE — 250N000013 HC RX MED GY IP 250 OP 250 PS 637: Performed by: STUDENT IN AN ORGANIZED HEALTH CARE EDUCATION/TRAINING PROGRAM

## 2024-10-18 PROCEDURE — 99239 HOSP IP/OBS DSCHRG MGMT >30: CPT | Performed by: NURSE PRACTITIONER

## 2024-10-18 PROCEDURE — 250N000013 HC RX MED GY IP 250 OP 250 PS 637: Performed by: PSYCHIATRY & NEUROLOGY

## 2024-10-18 RX ORDER — HYDROXYZINE HYDROCHLORIDE 25 MG/1
25-50 TABLET, FILM COATED ORAL DAILY PRN
DISCHARGE
Start: 2024-10-18

## 2024-10-18 RX ORDER — PRAZOSIN HYDROCHLORIDE 1 MG/1
1 CAPSULE ORAL AT BEDTIME
Qty: 30 CAPSULE | Refills: 0 | Status: SHIPPED | OUTPATIENT
Start: 2024-10-18

## 2024-10-18 RX ORDER — TRAZODONE HYDROCHLORIDE 50 MG/1
25-50 TABLET, FILM COATED ORAL
DISCHARGE
Start: 2024-10-18

## 2024-10-18 RX ORDER — LURASIDONE HYDROCHLORIDE 20 MG/1
20 TABLET, FILM COATED ORAL
Qty: 30 TABLET | Refills: 0 | Status: SHIPPED | OUTPATIENT
Start: 2024-10-18

## 2024-10-18 RX ADMIN — ACETAMINOPHEN 325MG 650 MG: 325 TABLET ORAL at 09:17

## 2024-10-18 RX ADMIN — TRAZODONE HYDROCHLORIDE 25 MG: 50 TABLET ORAL at 01:06

## 2024-10-18 ASSESSMENT — ACTIVITIES OF DAILY LIVING (ADL)
ADLS_ACUITY_SCORE: 35

## 2024-10-18 ASSESSMENT — COLUMBIA-SUICIDE SEVERITY RATING SCALE - C-SSRS
6. HAVE YOU EVER DONE ANYTHING, STARTED TO DO ANYTHING, OR PREPARED TO DO ANYTHING TO END YOUR LIFE?: NO
1. SINCE LAST CONTACT, HAVE YOU WISHED YOU WERE DEAD OR WISHED YOU COULD GO TO SLEEP AND NOT WAKE UP?: NO
TOTAL  NUMBER OF ABORTED OR SELF INTERRUPTED ATTEMPTS SINCE LAST CONTACT: NO
SUICIDE, SINCE LAST CONTACT: NO
ATTEMPT SINCE LAST CONTACT: NO
TOTAL  NUMBER OF INTERRUPTED ATTEMPTS SINCE LAST CONTACT: NO
2. HAVE YOU ACTUALLY HAD ANY THOUGHTS OF KILLING YOURSELF?: NO

## 2024-10-18 NOTE — ED NOTES
Awake in chair.  Reports inability to sleep.  Requesting additional medications for sleep.  Instructed will call on-call MD regarding her concerns.

## 2024-10-18 NOTE — PSYCH
Patient requested an additional medication for sleep. Will prescribe a one-time dose of Trazodone 25mg.

## 2024-10-18 NOTE — PROGRESS NOTES
Patient approached nurses station and requested to discharge. Stated she just doesn't feel like staying the night anymore. RN encouraged patient to spend the night with plan to discharge tomorrow. Patient agreeable at this time.

## 2024-10-18 NOTE — PROGRESS NOTES
Triage & Transition Services, Extended Care     Client Name: Skye Walker    Date: October 17, 2024    Patient was seen: no  Mode of Assessment: N/A    Service Type: excused from group session, other (see comments) (Pt was too tired to participate in group.)  Session Start Time:  2000    Session End Time: 2025  Session Length: 25  Site Location: [facility] EMERGENCY DEPT                             EMP12  Total Number ofAttendees: 6  Topic:   coping skills/lifestyle management, relaxation techniques, self-care activities, other (see comments) (Pt did not attend, she was too sleepy.)   Response: other (see comments) (Pt did not attend, she was too sleepy.)     HAZEL Schumacher   Licensed Mental Health Professional (LMHP), [facility]  [phone]

## 2024-10-18 NOTE — DISCHARGE INSTRUCTIONS
Contact [facility] again about scheduling with therapist. If she cannot see you, Jasmin can help you find a different therapist. Someone from [facility] will be calling you to follow up or you can call Jasmin at [phone]        SAFETY PLAN  Step 1: Warning signs:  Warning Signs   Feeling disconnected from my body, isolating, urges to self-harm, nightmares, hypervigilance      Step 2: Internal coping strategies - Things I can do to take my mind off my problems without contacting another person:  Strategies   box breathing, listening to music, reading, journaling, latch hook      Step 3: People and social settings that provide distraction:  Name Contact Information   Therapist          Step 4: People whom I can ask for help during a crisis:     Step 5: Professionals or agencies I can contact during a crisis:  Clinician/Agency Name Phone Emergency Contact        [facility]      Local Emergency Department Emergency Department Address Emergency Department Phone   [facility] Emergency Room [facility] [address] [place] [address] [phone]      Suicide Prevention Lifeline Phone: Call or Text 027  Crisis Text Line: Text HOME to 838175     Step 6: Making the environment safer (plan for lethal means safety):  Did not identify any lethal methods     Optional: What is most important to me and worth living for?:

## 2024-10-18 NOTE — ED PROVIDER NOTES
"EmPATH Unit - Initial Psychiatric Discharge Note  [facility] Emergency Department  Observation Initiation Date: Oct 17, 2024    Skye Walker MRN: [id number]   Age: 19 year old YOB: 2005     History     Chief Complaint   Patient presents with    Mental Health Problem     HPI  Skye \"V\" NIKHIL Walker is a 19 year old female with a past history notable for anxiety, depression, bipolar disorder, PTSD, ASD, and borderline personality disorder who presented to the ED with increasing episodes of dissociation, urges to self-harm and passive suicidal ideation in the context of various psychosocial stressors. Patient reports a recent break-up with her boyfriend and stopping all medications approximately one month ago to be contributing to current presentation. In the emergency department, this patient was determined to be medically stable and transferred to the EmPATH unit for psychiatric assessment. Today 10/18/2024 on approach they are nearing 32.5 hours in the emergency care.     Please see the [facility] assessment & reassessment (if available), ED physician notes and nursing notes for additional information and collateral content, including labs and imaging if available. All were reviewed prior to meeting with the patient.      She was seen by Yamileth Christopher CNP yesterday who noted the following:  Skye was agreeable to meet with me, they go by \"V.\" V explains that she felt increasingly anxious and upset following a break-up with her boyfriend yesterday. She was having intense urges to self-harm and decided to Uber to [facility] for help. She recalls that since discharging from inpatient last month she stopped taking medications. She has had increasing anxiety leading to periods of \"dissociation.\" She has had difficulty sleeping and reports ongoing nightmares. Her mood has federica labile. She will \"lose it\" at times when anxious. When not anxious her low mood is prominent often leading to fatigue and poor concentration. " She tells me she has been diagnosed recently as bipolar however she disagrees with this and says that she completed an assessment at [facility] and was told that she has depression, ASD, anxiety, borderline personality disorder and trauma. When asked about noemi she says that when she was younger she would have several days at at time that she would not sleep, engage in risky sexual behavior, and experienced intense mood fluctuations. In regards to medications, she recalls numerous previous medication trials including a variety of SSRIs, SNRIs, and mood stabilizers. She has difficulty with medication compliance and would prefer a simplified medication regimen targeting depression and mood stabilization. She expresses concerns with weight gain while taking Abilify and Seroquel in the past and does not wish to resume these. She has found prazosin helpful for nightmares and would like to restart this. She would like to remain at [facility] under observation while restarting medications.     Treatment Plan:  -Start Latuda 20mg daily further targeting depression and affect stability  -Start prazosin 1mg at bedtime targeting nightmares, titrate as tolerable   -Medication education provided this visit including but not limited to: Rationale for medication, importance of medication adherence, medication interactions, common medication side effects, benefits of medications.  -Individual psychotherapy and outpatient psychiatric care recommended for additional support and ongoing development of nonpharmacologic coping skills and strategies.    -Anticipate resuming outpatient medication management appointments and psychotherapy once stable   -Problem focused supportive therapy and education provided today related to patient's current and acute stressors, symptoms, and diagnoses.  -Remain at [facility] under observation with reassessment tomorrow     Past Medical History  No past medical history on file.  No past surgical history on  "file.  hydrOXYzine HCl (ATARAX) 25 MG tablet  lurasidone (LATUDA) 20 MG TABS tablet  prazosin (MINIPRESS) 1 MG capsule  traZODone (DESYREL) 50 MG tablet      Allergies   Allergen Reactions    Cats Itching     Runny eyes and nose    Seasonal Allergies Itching     Running nose, and eyes    Shellfish-Derived Products      Family History  No family history on file.  Social History           Review of Systems  A medically appropriate review of systems was performed with pertinent positives and negatives noted in the HPI, and all other systems negative.    Physical Examination   BP: (!) 145/73  Pulse: 103  Temp: 98.8  F (37.1  C)  Resp: 20  Height: 160 cm (5' 3\")  Weight: 127 kg (280 lb)  SpO2: 96 %    Physical Exam  General: Appears stated age.   Neuro: Alert and fully oriented. Extremities appear to demonstrate normal strength on visual inspection.   Integumentary/Skin: no rash visualized, normal color    Psychiatric Examination   Appearance: awake, alert  Attitude:  cooperative  Eye Contact:  good  Mood:  better  Affect:  appropriate and in normal range  Speech:  clear, coherent and normal prosody  Psychomotor Behavior:  no evidence of tardive dyskinesia, dystonia, or tics and intact station, gait and muscle tone  Thought Process:  linear and goal oriented  Associations:  no loose associations  Thought Content:  no evidence of suicidal ideation or homicidal ideation and no evidence of psychotic thought  Insight:  good  Judgement:  fair  Oriented to:  time, person, and place  Attention Span and Concentration:  intact  Recent and Remote Memory:  intact  Language: able to name/identify objects without impairment  Fund of Knowledge: intact with awareness of current and past events          ED Course     ED Course as of 10/18/24 1116   u Oct 17, 2024   0256 I obtained history and examined the patient as noted above.        Labs Ordered and Resulted from Time of ED Arrival to Time of ED Departure - No data to " display    Observation Course   The patient was found to have a psychiatric condition that would benefit from an observation stay in the emergency department for further psychiatric stabilization and/or coordination of a safe disposition. The plan upon observation admission included serial assessments of psychiatric condition, potential administration of medications if indicated, further disposition pending the patient's psychiatric course during the monitoring period.     Serial assessments of the patient's psychiatric condition were performed. Nursing notes were reviewed. During the observation period, the patient did not require medications for agitation, and did not require restraints/seclusion for patient and/or provider safety.     After a period of working with the treatment team on the EmPATH unit, the patient's mental state improved to allow a safe transition to outpatient care. After counseling on the diagnosis, work-up, and treatment plan, the patient was discharged. Close follow-up with a psychiatrist and/or therapist was recommended and community psychiatric resources were provided. Patient is to return to the ED if any urgent or potentially life-threatening concerns.     Discharge Diagnoses:   Final diagnoses:   Suicidal thoughts   Panic attack   Unspecified mood (affective) disorder (H)   PTSD (post-traumatic stress disorder)       Treatment Plan:    Medication Sig    hydrOXYzine HCl (ATARAX) 25 MG tablet Take 1-2 tablets (25-50 mg) by mouth daily as needed for anxiety.    lurasidone (LATUDA) 20 MG TABS tablet Take 1 tablet (20 mg) by mouth daily with food to treat mood disorder.    prazosin (MINIPRESS) 1 MG capsule Take 1 capsule (1 mg) by mouth at bedtime for PTSD nightmares.    traZODone (DESYREL) 50 MG tablet Take 0.5-1 tablets (25-50 mg) by mouth nightly as needed for insomnia.    -Medication education provided this visit includes, rationale for medication, importance of compliance, medication  interactions, and common side effects.   -Supportive psychotherapy provided regarding patient's stressors and past mental health symptoms problem solving ways to improve overall wellness and cope with acute and chronic mental health symptoms.  -Follow up with appointments as scheduled.  - Discharge home.  - Return if symptoms worsen.    At the time of discharge, the patient's acute suicide risk was determined to be low due to the following factors: Reduction in the intensity of mood/anxiety symptoms that preceded the admission, denial of suicidal thoughts, denies feeling helpless or helpless, not currently under the influence of alcohol or illicit substances, denies experiencing command hallucinations, no immediate access to firearms.   The patient's acute risk could be higher if noncompliant with their treatment plan, medications, follow-up appointments or using illicit substances or alcohol. Protective factors include: social supports,  stable housing, and a desire to work toward mental health and wellness.    I spent more than 30 minutes on discharge day activities.    --  SHELIA Palomino CNP  [facility] EMERGENCY DEPT  EmPATH Unit       Danita Shaver APRN CNP  10/20/24 1821

## 2024-10-18 NOTE — ED NOTES
Pt woke up this morning feeling calm and cooperative. Client told this writer that she slept a little better with the trazodone that she was prescribed last night. She told me that she is used to using Melatonin 6 mg po at HS for sleep, denies any SI/HI and no hallucination nor any urges for SIB. Wants to discharge today home as she believes she is ready.

## 2024-10-18 NOTE — PROGRESS NOTES
"Triage and Transition Services Extended Care Reassessment     Patient: Skye goes by \"Skye,\" uses she/her pronouns  Date of Service: October 18, 2024  Site of Service: [facility] EMERGENCY DEPT                               Patient was seen yes  Mode of Assessment: In person     Reason for Reassessment: other (see comment) (discharge planning)    History of Patient's Original Emergency Room Encounter: Pt was previously on [facility] 7 months ago for similar presentation. Since discharge, pt has presented to [facility] ED and spent a week inpatient at [facility]. Pt states that when she was discharged she stopped taking her medication and did not follow up with resources. Pt states that she does not currently have a therapist or psychiatrist. She states that she takes hydroxyzine for anxiety symptoms. Patient identified previous mental health diagnoses of MDD, WILMAR, borderline personality disorder, bipolar disorder, ASD, and PTSD. She reported hearing voices and feeling anxious at baseline. Pt has a history of cutting and head banging. She last engaged in self-harm by cutting in January 2024. Pt stated that dissociation symptoms emerged in September 2023.    Current Patient Presentation: Pt was resting in recliner when [facility] approached. She was cooperative with reassessment and indicated she wanted to discharge. She denies cuttent SI/HI/SIB. She reports feeling more stable since being on Em[facility] as notes her stay here stopped her from acting on urges to cut    Presentation Summary: Pt was calm and cooperative on unit. She spent most of the time here resting. She reviewed safety plan and identified aftercare plan    Changes Observed Since Initial Assessment: decrease in presenting symptoms    Therapeutic Interventions Provided: Engaged in safety planning    Current Symptoms:  (NA) low self esteem, sadness  (NA)  (NA)  (NA)    Mental Status Exam   Affect: Flat  Appearance: Appropriate  Attention Span/Concentration: " Attentive  Eye Contact: Variable    Fund of Knowledge: Appropriate   Language /Speech Content: Fluent  Language /Speech Volume: Normal  Language /Speech Rate/Productions: Normal  Recent Memory: Intact  Remote Memory: Intact  Mood: Sad  Orientation to Person: Yes   Orientation to Place: Yes  Orientation to Time of Day: Yes  Orientation to Date: Yes     Situation (Do they understand why they are here?): Yes  Psychomotor Behavior: Normal  Thought Content: Clear  Thought Form: Intact    Treatment Objective(s) Addressed: rapport building, safety planning, identifying an appropriate aftercare plan    Patient Response to Interventions: acceptance expressed, verbalizes understanding    Progress Towards Goals:  Patient Reports Symptoms Are: stable  Patient Progress Toward Goals: is making progress  Next Step to Work Toward Discharge: patient ability to engage in safety planning  Symptom Stabilization Comment: Pt continues to endorse urges for self harm.  Ability to Engage Comment: Pt tells LMHP she is unable to safety plan to return to the community.    Case Management:      C-SSRS Since Last Contact:   1. Wish to be Dead (Since Last Contact): No  2. Non-Specific Active Suicidal Thoughts (Since Last Contact): No     Actual Attempt (Since Last Contact): No  Has subject engaged in non-suicidal self-injurious behavior? (Since Last Contact): No  Interrupted Attempts (Since Last Contact): No  Aborted or Self-Interrupted Attempt (Since Last Contact): No  Preparatory Acts or Behavior (Since Last Contact): No  Suicide (Since Last Contact): No     Calculated C-SSRS Risk Score (Since Last Contact): No Risk Indicated    Plan: Final Disposition / Recommended Care Path: discharge  Plan for Care reviewed with assigned Medical Provider: yes (Danita Shaver)  Plan for Care Team Review: provider, RN  Comments: provider not available at time of discharge  Patient and/or validated legal guardian concurs: yes  Clinical Substantiation: Pt presented  in ED for worsening depressive symptoms, SI and SIB urges following a recent unexpected break-up with her boyfriend. While on [facility], Pt took prescribed meds and engaged with therapists and nursing staff for check-ins. She was calm and coooerative throughout her stay. She did ask to discharge during the night but agreed to stay to meet with therapist and provider today. She states she feels safe to discharge home. She has a  and a CADI . She has a psychiatrist. She reached to therapist to reconnect earlier this week. She is waiting to hear back from clinic if therapist has room in schedule. Pt declined referral to another therapist and was informed that she can contact [facility] if she cannot see previous therapist and she wants a referral. Updated safety plan on file    Legal Status: Legal Status at Admission: Voluntary/Patient has signed consent for treatment    Session Status: Time session started: 0905  Time session ended: 0930  Session Duration (minutes): 25 minutes  Session Number: 2  Anticipated number of sessions or this episode of care: 2  Date of most recent diagnostic assessment: 10/17/24    Session Start Time: 0905  Session Stop Time: 0930  CPT codes: 66124 - Psychotherapy (with patient) - 30 (16-37*) min  Time Spent: 25 minutes      CPT code(s) utilized: 01432 - Psychotherapy (with patient) - 30 (16-37*) min    Diagnosis:   Patient Active Problem List   Diagnosis Code    Major depressive disorder, recurrent, moderate (H) F33.1    Generalized anxiety disorder F41.1    Mood disorder (H) F39    Adjustment disorder with depressed mood F43.21    Panic attack F41.0    Depression with anxiety F41.8    Suicidal thoughts R45.851    Major depressive disorder with current active episode, unspecified depression episode severity, unspecified whether recurrent F32.9       Primary Problem This Admission: Active Hospital Problems    Suicidal thoughts      *Major depressive disorder with current  active episode, unspecified depression episode severity, unspecified whether recurrent        CE Ochoa   Licensed Mental Health Professional (LMHP), Extended Care  [phone]

## 2024-10-18 NOTE — PROGRESS NOTES
Patient agrees to discharge plan. Discharge instructions reviewed with patient including follow-up care plan. Medications: have been sent to patients preferred pharmacy. Reviewed safety plan and outpatient resources. Denies SI and HI. All belongings that were brought into the hospital have been returned to patient. Escorted off the unit at door six accompanied by [facility] staff. Discharged to home/self care via taxi.

## 2024-10-19 ENCOUNTER — TELEPHONE (OUTPATIENT)
Dept: BEHAVIORAL HEALTH | Facility: CLINIC | Age: 19
End: 2024-10-19
Payer: COMMERCIAL

## 2024-10-19 NOTE — TELEPHONE ENCOUNTER
Unable to reach patient. Wrong number on file. No other phone number for patient was available in [facility] at time of call. No further follow-up is needed.

## 2024-10-22 ENCOUNTER — PATIENT OUTREACH (OUTPATIENT)
Dept: CARE COORDINATION | Facility: CLINIC | Age: 19
End: 2024-10-22
Payer: COMMERCIAL

## 2024-10-22 NOTE — PROGRESS NOTES
Connected Care Resource Center Contact  [facility]/Voicemail     Clinical Data: Post-Discharge Outreach     Outreach attempted x 2.  Left message on patient's voicemail, providing [facility]'s central phone number of [phone] ([phone]) for questions/concerns and/or to schedule an appt with an [facility] provider, if they do not have a PCP.      Plan:  [facility] will do no further outreaches at this time.       MARIA Durant  Connected Care Resource Center, [facility]    *Connected Care Resource Team does NOT follow patient ongoing. Referrals are identified based on internal discharge reports and the outreach is to ensure patient has an understanding of their discharge instructions.